# Patient Record
Sex: FEMALE | Race: WHITE | NOT HISPANIC OR LATINO | Employment: UNEMPLOYED | URBAN - NONMETROPOLITAN AREA
[De-identification: names, ages, dates, MRNs, and addresses within clinical notes are randomized per-mention and may not be internally consistent; named-entity substitution may affect disease eponyms.]

---

## 2021-01-25 LAB
BILIRUB SERPL-MCNC: NEGATIVE MG/DL
BLOOD URINE, POC: NEGATIVE
CLARITY, POC UA: CLEAR
COLOR, POC UA: NORMAL
GLUCOSE UR QL STRIP: NEGATIVE
KETONES UR QL STRIP: NORMAL
LEUKOCYTE EST, POC UA: NORMAL
NITRITE, POC UA: NEGATIVE
PH, POC UA: 6.5
POC BETA-HCG (QUAL): POSITIVE
PROTEIN, POC: NEGATIVE
SPECIFIC GRAVITY, POC UA: 1.02
UROBILINOGEN, POC UA: NORMAL

## 2021-02-23 LAB
CLARITY, POC UA: CLEAR
COLOR, POC UA: YELLOW
GLUCOSE UR QL STRIP: NEGATIVE
LEUKOCYTE EST, POC UA: NEGATIVE
NITRITE, POC UA: NEGATIVE
PROTEIN, POC: NEGATIVE

## 2021-04-20 ENCOUNTER — HISTORICAL (OUTPATIENT)
Dept: ADMINISTRATIVE | Facility: HOSPITAL | Age: 31
End: 2021-04-20

## 2021-06-17 LAB — GLUCOSE 1H P 100 G GLC PO SERPL-MCNC: 104 MG/DL (ref 70–140)

## 2021-08-03 LAB
CLARITY, POC UA: CLEAR
GLUCOSE UR QL STRIP: NEGATIVE
LEUKOCYTE EST, POC UA: NEGATIVE
NITRITE, POC UA: NEGATIVE
PROTEIN, POC: NEGATIVE

## 2021-08-05 LAB — RPR SER QL: NORMAL

## 2021-08-16 LAB
BASOPHILS # BLD AUTO: 0.05 X10(3)/MCL (ref 0.01–0.08)
BASOPHILS NFR BLD AUTO: 0.3 % (ref 0.1–1.2)
EOSINOPHIL # BLD AUTO: 0.13 X10(3)/MCL (ref 0.04–0.36)
EOSINOPHIL NFR BLD AUTO: 0.9 % (ref 0.7–7)
ERYTHROCYTE [DISTWIDTH] IN BLOOD BY AUTOMATED COUNT: 13 % (ref 11–14.5)
HCT VFR BLD AUTO: 27.1 % (ref 36–48)
HGB BLD-MCNC: 9.1 G/DL (ref 11.8–16)
IMM GRANULOCYTES # BLD AUTO: 0.08 X10E3/UL (ref 0–0.03)
IMM GRANULOCYTES NFR BLD AUTO: 0.5 % (ref 0–0.5)
LYMPHOCYTES # BLD AUTO: 2.24 X10(3)/MCL (ref 1.16–3.74)
LYMPHOCYTES NFR BLD AUTO: 15.2 % (ref 20–55)
MCH RBC QN AUTO: 29.3 PG (ref 27–34)
MCHC RBC AUTO-ENTMCNC: 33.6 G/DL (ref 31–37)
MCV RBC AUTO: 87.1 FL (ref 79–99)
MONOCYTES # BLD AUTO: 1.06 X10(3)/MCL (ref 0.24–0.36)
MONOCYTES NFR BLD AUTO: 7.2 % (ref 4.7–12.5)
NEUTROPHILS # BLD AUTO: 11.18 X10(3)/MCL (ref 1.56–6.13)
NEUTROPHILS NFR BLD AUTO: 75.9 % (ref 37–73)
PLATELET # BLD AUTO: 226 X10(3)/MCL (ref 140–371)
PMV BLD AUTO: 11.1 FL (ref 9.4–12.4)
RBC # BLD AUTO: 3.11 X10(6)/MCL (ref 4–5.1)
WBC # SPEC AUTO: 14.7 X10(3)/MCL (ref 4–11.5)

## 2022-04-10 ENCOUNTER — HISTORICAL (OUTPATIENT)
Dept: ADMINISTRATIVE | Facility: HOSPITAL | Age: 32
End: 2022-04-10

## 2022-04-29 ENCOUNTER — HISTORICAL (OUTPATIENT)
Dept: ADMINISTRATIVE | Facility: HOSPITAL | Age: 32
End: 2022-04-29

## 2022-04-29 VITALS
SYSTOLIC BLOOD PRESSURE: 112 MMHG | BODY MASS INDEX: 23.07 KG/M2 | WEIGHT: 138.44 LBS | DIASTOLIC BLOOD PRESSURE: 74 MMHG | HEIGHT: 65 IN

## 2022-05-03 NOTE — HISTORICAL [facility] CERNER
This is a historical note converted from Paul. Formatting and pictures may have been removed.  Please reference Paul for original formatting and attached multimedia. Chief Complaint  25w4d tummy check. Repeat TVUS  History of Present Illness  30yo WF  at 25w4d here for ob check  LMP/EGA/KIM  Gestational Age (EGA) and KIM?? ? * Note: EGA calculated as of 2021  ?  KIM:?2021???EGA*:?25 weeks 4 days ? ? ? ? ? ?Type:?Authoritative??????Method Date:?2020  ?  ?? ? ?Method:?Last Menstrual Period?(2020)  ?? ? ?Confirmation:?Confirmed  ?? ? ?Description:?--  ?? ? ?Comments:?--  ?? ? ?Entered by:?Jolene Bruno LPN on 2021?  ?  Other KIM Calculations for this Pregnancy:  ?? ? ?No additional KIM calculations have been recorded for this pregnancy  Gynecological History  Date of Last Pap Smear: 15  Age of Menarche: 12  STIs/STDs: No  Abnormal Pap: No  Dyspareunia: No  Postcoital Bleeding: No  Dysuria: No  Additional GYN Information: last pap 2017 wnl  Discharge OB: none reported  Sexually Active: Yes  Review of Systems  General/Constitutional:  Fever?denies?.?  Skin:  Rash?denies.  Gastrointestinal:  Abdominal pain?denies?. Constipation?denies?. Diarrhea?denies?. Heartburn?denies?. Nausea?denies?. Vomiting?denies?  Genitourinary:  Painful urination?denies.  Gynecologic:  Vaginal bleeding?denies?. Vaginal discharge?Normal. Leaking amniotic fluid?denies. ?Contractions?denies?  ?  Physical Exam  Vitals & Measurements  T:?36.2? ?C (Temporal Artery)? BP:?120/74?  HT:?160.00?cm? WT:?66.100?kg? BMI:?25.82?  Gen: NAD  Abd: Gravid, NT  Ext: No CCE  ?  TVUS: Complete previa still present.  FH: 25cm  FHT: 130  Assessment/Plan  1.?Placenta previa antepartum?O44.02  2.?25 weeks gestation of pregnancy?Z3A.25  Reviewed standard labor unit and kick count precautions.  Discussed pre-eclampsia precautions.  Discussed COVID-19 risks, social distancing, and isolation if respiratory symptoms  develop.?  Complete previa still present, pelvic rest, no intercourse  Repeat U/S on RTC  RTC 4 week   OB History  Pregnancy History???(4,0,0,4)?? ??  Pregnancy # 1  Baby 1?????????????Outcome Date:?2007????? Outcome:?Live Birth  ???Outcome or Result:?Vaginal  ???Gender:?Male????????Gest Age:?40 weeks ??????Wt:??3260 g  ???Hospital:?--????????[facility]:?--  ???Ashley Name:?--?????Babys Father:?--  ?  Pregnancy # 2  Baby 1?????????????Outcome Date:?02/15/2012????? Outcome:?Live Birth  ???Outcome or Result:?Vaginal  ???Gender:?Female????????Gest Age:?38 weeks ??????Wt:??3515 g  ???Hospital:?--????????[facility]:?--  ???Ashley Name:?--?????Babys Father:?--  ?  Pregnancy # 3  Baby 1?????????????Outcome Date:?2014????? Outcome:?Live Birth  ???Outcome or Result:?Vaginal  ???Gender:?--????????Gest Age:?40 weeks 4 days ??????Wt:?--  ???Hospital:?--????????[facility]:?--  ???Ashley Name:?--?????Babys Father:?--  ?  Pregnancy # 4  Baby 1?????????????Outcome Date:?10/25/2017????? Outcome:?Live Birth  ???Outcome or Result:?Vaginal  ???Gender:?Female????????Gest Age:?40 weeks 2 days ??????Wt:??3515 g  ???Hospital:?--????????[facility]:?--  ???Ashley Name:?--?????Babys Father:?--  Problem List/Past Medical History  Ongoing  Dizziness  Placenta previa antepartum  Pregnant  Supervision of normal pregnancy, antepartum  Historical  Pregnant  Pregnant  Pregnant  Pregnant  Medications  Prenatal 1 Plus 1 (Pt. Own), 1 tab(s), Oral, Daily  Allergies  No Known Allergies  Social History  Abuse/Neglect  No, 2021  Alcohol  Never, 2021  Employment/School  Unemployed, Highest education level: High school., 2021  Sexual  Sexually active: Yes. No, 2021  Substance Use  Never, 2021  Tobacco  Never (less than 100 in lifetime), N/A, 2021  Family History  Primary malignant neoplasm of colon: Negative: Mother, Father, Sister and Brother.  Primary malignant neoplasm of female  genital organ: Negative: Mother and Sister.  Health Maintenance  Health Maintenance  ???Pending?(in the next year)  ??? ??OverDue  ??? ? ? ?Influenza Vaccine due??10/01/20??and every 1??day(s)  ??? ? ? ?Cervical Cancer Screening due??12/05/20??and every 3??year(s)  ??? ? ? ?Alcohol Misuse Screening due??01/02/21??and every 1??year(s)  ??? ??Due?  ??? ? ? ?ADL Screening due??05/25/21??and every 1??year(s)  ??? ? ? ?Depression Screening due??05/25/21??Unknown Frequency  ??? ? ? ?Tetanus Vaccine due??05/25/21??and every 10??year(s)  ??? ??Due In Future?  ??? ? ? ?Obesity Screening not due until??01/01/22??and every 1??year(s)  ??? ? ? ?Blood Pressure Screening not due until??04/27/22??and every 1??year(s)  ???Satisfied?(in the past 1 year)  ??? ??Satisfied?  ??? ? ? ?Blood Pressure Screening on??05/25/21.??Satisfied by Mandie Suazo LPN  ??? ? ? ?Body Mass Index Check on??05/25/21.??Satisfied by Mandie Suazo LPN  ??? ? ? ?Cervical Cancer Screening on??01/25/21.??Satisfied by Trae Chandler MD  ??? ? ? ?Obesity Screening on??05/25/21.??Satisfied by Mandie Suazo LPN  ?

## 2022-09-15 ENCOUNTER — HISTORICAL (OUTPATIENT)
Dept: ADMINISTRATIVE | Facility: HOSPITAL | Age: 32
End: 2022-09-15

## 2023-11-28 ENCOUNTER — HOSPITAL ENCOUNTER (EMERGENCY)
Facility: HOSPITAL | Age: 33
Discharge: HOME | End: 2023-11-28
Attending: EMERGENCY MEDICINE
Payer: MEDICAID

## 2023-11-28 VITALS
RESPIRATION RATE: 18 BRPM | HEIGHT: 65 IN | SYSTOLIC BLOOD PRESSURE: 118 MMHG | HEART RATE: 80 BPM | DIASTOLIC BLOOD PRESSURE: 76 MMHG | BODY MASS INDEX: 23.32 KG/M2 | OXYGEN SATURATION: 98 % | TEMPERATURE: 98 F | WEIGHT: 140 LBS
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
## 2023-11-28 PROCEDURE — 25000003 PHARM REV CODE 250: Performed by: NURSE PRACTITIONER

## 2023-11-28 PROCEDURE — 90715 TDAP VACCINE 7 YRS/> IM: CPT | Performed by: NURSE PRACTITIONER

## 2023-11-28 PROCEDURE — 96374 THER/PROPH/DIAG INJ IV PUSH: CPT

## 2023-11-28 PROCEDURE — 63600175 PHARM REV CODE 636 W HCPCS: Performed by: NURSE PRACTITIONER

## 2023-11-28 PROCEDURE — 90471 IMMUNIZATION ADMIN: CPT | Performed by: NURSE PRACTITIONER

## 2023-11-28 PROCEDURE — 99284 EMERGENCY DEPT VISIT MOD MDM: CPT | Mod: 25

## 2023-11-28 RX ORDER — AMOXICILLIN AND CLAVULANATE POTASSIUM 875; 125 MG/1; MG/1
1 TABLET, FILM COATED ORAL 2 TIMES DAILY
Qty: 20 TABLET | Refills: 0 | Status: SHIPPED | OUTPATIENT
Start: 2023-11-28 | End: 2023-11-28 | Stop reason: SDUPTHER

## 2023-11-28 RX ORDER — ONDANSETRON 4 MG/1
4 TABLET, ORALLY DISINTEGRATING ORAL
Status: COMPLETED | OUTPATIENT
Start: 2023-11-28 | End: 2023-11-28

## 2023-11-28 RX ORDER — MORPHINE SULFATE 4 MG/ML
4 INJECTION, SOLUTION INTRAMUSCULAR; INTRAVENOUS
Status: COMPLETED | OUTPATIENT
Start: 2023-11-28 | End: 2023-11-28

## 2023-11-28 RX ORDER — HYDROCODONE BITARTRATE AND ACETAMINOPHEN 5; 325 MG/1; MG/1
1 TABLET ORAL EVERY 6 HOURS PRN
Qty: 12 TABLET | Refills: 0 | Status: SHIPPED | OUTPATIENT
Start: 2023-11-28

## 2023-11-28 RX ORDER — AMOXICILLIN AND CLAVULANATE POTASSIUM 875; 125 MG/1; MG/1
1 TABLET, FILM COATED ORAL 2 TIMES DAILY
Qty: 20 TABLET | Refills: 0 | Status: SHIPPED | OUTPATIENT
Start: 2023-11-28 | End: 2023-12-08

## 2023-11-28 RX ORDER — ONDANSETRON 4 MG/1
8 TABLET, ORALLY DISINTEGRATING ORAL 2 TIMES DAILY
Qty: 20 TABLET | Refills: 0 | Status: SHIPPED | OUTPATIENT
Start: 2023-11-28 | End: 2023-11-28 | Stop reason: SDUPTHER

## 2023-11-28 RX ORDER — ONDANSETRON 4 MG/1
8 TABLET, ORALLY DISINTEGRATING ORAL 2 TIMES DAILY
Qty: 20 TABLET | Refills: 0 | Status: SHIPPED | OUTPATIENT
Start: 2023-11-28 | End: 2023-12-03

## 2023-11-28 RX ORDER — MUPIROCIN 20 MG/G
OINTMENT TOPICAL 3 TIMES DAILY
Qty: 22 G | Refills: 0 | Status: SHIPPED | OUTPATIENT
Start: 2023-11-28 | End: 2023-12-03

## 2023-11-28 RX ORDER — HYDROCODONE BITARTRATE AND ACETAMINOPHEN 5; 325 MG/1; MG/1
1 TABLET ORAL EVERY 6 HOURS PRN
Qty: 12 TABLET | Refills: 0 | Status: SHIPPED | OUTPATIENT
Start: 2023-11-28 | End: 2023-11-28 | Stop reason: SDUPTHER

## 2023-11-28 RX ORDER — LIDOCAINE HYDROCHLORIDE 10 MG/ML
5 INJECTION, SOLUTION EPIDURAL; INFILTRATION; INTRACAUDAL; PERINEURAL
Status: DISCONTINUED | OUTPATIENT
Start: 2023-11-28 | End: 2023-11-28

## 2023-11-28 RX ORDER — MUPIROCIN 20 MG/G
OINTMENT TOPICAL 3 TIMES DAILY
Qty: 22 G | Refills: 0 | Status: SHIPPED | OUTPATIENT
Start: 2023-11-28 | End: 2023-11-28 | Stop reason: SDUPTHER

## 2023-11-28 RX ADMIN — MORPHINE SULFATE 4 MG: 4 INJECTION, SOLUTION INTRAMUSCULAR; INTRAVENOUS at 01:11

## 2023-11-28 RX ADMIN — ONDANSETRON 4 MG: 4 TABLET, ORALLY DISINTEGRATING ORAL at 01:11

## 2023-11-28 RX ADMIN — TETANUS TOXOID, REDUCED DIPHTHERIA TOXOID AND ACELLULAR PERTUSSIS VACCINE, ADSORBED 0.5 ML: 5; 2.5; 8; 8; 2.5 SUSPENSION INTRAMUSCULAR at 01:11

## 2023-11-28 NOTE — ED PROVIDER NOTES
Encounter Date: 11/28/2023       History     Chief Complaint   Patient presents with    Animal Bite     Brought per [facility] for dog bite to both arms and lower back. EMS reports animal control on scene     Patient is a 33-year-old female presents emerged department via [facility] for dog bite wounds to bilateral forearm.  The patient states she was trying to associate this dog with an underground electric fence she had a shot, dog was trying to take him to the edges of the boundary of the fence and when she did and shock the dog he turned around her and attacked her biting both of her forearms.  She has approximately 12 superficial lacerations to the right forearm as well as around 6-7 lacerations to the left forearm.  There is no active bleeding.  There is no major deformity to the forearm except for the obvious puncture wounds.  She does present with sterile dressing from [facility] an ambulance.  She denies any other complaints associated symptoms at this time.      Review of patient's allergies indicates:  No Known Allergies  No past medical history on file.  No past surgical history on file.  No family history on file.  Social History     Tobacco Use    Smoking status: Never    Smokeless tobacco: Never     Review of Systems   Constitutional:  Negative for activity change, appetite change and fever.   HENT:  Negative for congestion, dental problem and sore throat.    Eyes:  Negative for discharge and itching.   Respiratory:  Negative for apnea, chest tightness and shortness of breath.    Cardiovascular:  Negative for chest pain.   Gastrointestinal:  Negative for abdominal distention, abdominal pain and nausea.   Endocrine: Negative for cold intolerance and heat intolerance.   Genitourinary:  Negative for dysuria, vaginal bleeding, vaginal discharge and vaginal pain.   Musculoskeletal:  Negative for back pain.   Skin:  Negative for rash.   Neurological:  Negative for dizziness, facial asymmetry and weakness.   Hematological:   Does not bruise/bleed easily.   Psychiatric/Behavioral:  Negative for agitation and behavioral problems.    All other systems reviewed and are negative.      Physical Exam     Initial Vitals [11/28/23 1256]   BP Pulse Resp Temp SpO2   126/69 74 16 97.6 °F (36.4 °C) 100 %      MAP       --         Physical Exam    Nursing note and vitals reviewed.  Constitutional: Vital signs are normal. She appears well-developed and well-nourished.  Non-toxic appearance. She does not have a sickly appearance.   HENT:   Head: Normocephalic and atraumatic.   Right Ear: External ear normal.   Left Ear: External ear normal.   Eyes: Conjunctivae, EOM and lids are normal. Lids are everted and swept, no foreign bodies found.   Neck: Trachea normal and phonation normal. Neck supple. No thyroid mass and no thyromegaly present.   Normal range of motion.   Full passive range of motion without pain.     Cardiovascular:  Normal rate, regular rhythm, S1 normal, S2 normal, normal heart sounds, intact distal pulses and normal pulses.           Pulmonary/Chest: Breath sounds normal. No respiratory distress.   Musculoskeletal:      Cervical back: Full passive range of motion without pain, normal range of motion and neck supple.     Lymphadenopathy:     She has no cervical adenopathy.   Neurological: She is alert and oriented to person, place, and time. She has normal strength.   Skin: Skin is warm, dry and intact. Capillary refill takes less than 2 seconds.   Moderate amount of edema to bilateral lower extremities.  Multiple dog bite wounds to right and left forearm.  There is approximately 12 to the right with 2 that are gaping as well as 6 laceration to the left forearm that are also mildly gaping.  The largest of these measures only around 1 cm in his approximately 3 mm wide and around 5 mm deep.   Psychiatric: She has a normal mood and affect. Her speech is normal and behavior is normal. Judgment normal. Cognition and memory are normal.          ED Course   Procedures  Labs Reviewed - No data to display       Imaging Results              X-Ray Forearm Left (Final result)  Result time 11/28/23 15:23:12      Final result by Real Lr MD (11/28/23 15:23:12)                   Impression:      1. No acute osseous defect identified  2. Suspect soft tissue injury and soft tissue emphysema at the forearm.  Clinical correlation is indicated      Electronically signed by: Real Lr  Date:    11/28/2023  Time:    15:23               Narrative:    EXAMINATION:  XR FOREARM LEFT    CLINICAL HISTORY:  Bitten by dog, initial encounter; .    COMPARISON:  None available.    FINDINGS:  AP and lateral views reveal no definite fracture or dislocation.  Joint spaces appear grossly intact.  No aggressive osteolytic or osteoblastic lesion is seen.  There is disruption of soft tissues and scattered suspect the soft tissue emphysema at the forearm.  No obvious radiopaque foreign body is seen.                                       X-Ray Forearm Right (Final result)  Result time 11/28/23 15:22:26      Final result by Real Lr MD (11/28/23 15:22:26)                   Impression:      1. No acute osseous defect identified  2. Suspect soft tissue injury at the mid forearm.  Clinical correlation is indicated      Electronically signed by: Real Lr  Date:    11/28/2023  Time:    15:22               Narrative:    EXAMINATION:  XR FOREARM RIGHT    CLINICAL HISTORY:  Bitten by dog, initial encounter; .    COMPARISON:  None available.    FINDINGS:  AP and lateral views reveal no definite fracture dislocation.  Joint spaces appear grossly intact.  No aggressive osteolytic or osteoblastic lesion is seen.There is disruption of soft tissues at the mid forearm suggesting soft tissue injury.                                       Medications   Tdap (BOOSTRIX) vaccine injection 0.5 mL (0.5 mLs Intramuscular Given 11/28/23 1825)   morphine injection 4 mg (4 mg  Intravenous Given 11/28/23 1333)   ondansetron disintegrating tablet 4 mg (4 mg Oral Given 11/28/23 1333)     Medical Decision Making  Patient is a 33-year-old female presents emerged department via [facility] for dog bite wounds to bilateral forearm.  The patient states she was trying to associate this dog with an underground electric fence she had a shot, dog was trying to take him to the edges of the boundary of the fence and when she did and shock the dog he turned around her and attacked her biting both of her forearms.  She has approximately 12 superficial lacerations to the right forearm as well as around 6-7 lacerations to the left forearm.  There is no active bleeding.  There is no major deformity to the forearm except for the obvious puncture wounds.  She does present with sterile dressing from [facility] an ambulance.  She denies any other complaints associated symptoms at this time.        Problems Addressed:  Dog bite, initial encounter: acute illness or injury     Details: Wounds were cleaned thoroughly with chlorhexidine saline.  Loose approximation of wounds with sutures was going to be performed but patient refused even after moderate amount of education about risks benefits of this.  Antibiotics topical and oral sent abuse on outpatient basis.  Also pain medicine nausea medicine sent.  Strict ED return precautions discussed [place] change or worsening symptoms.    Amount and/or Complexity of Data Reviewed  Radiology: ordered.    Risk  Prescription drug management.               ED Course as of 11/28/23 1618 Tue Nov 28, 2023   1400 Wounds were cleaned thoroughly with chlorhexidine and saline and wrapped sterile dressing. [SL]   1606 Went in the room to loosely approximate some of the dog bite wounds that were more gaping but the patient is refusing at this time.  She states the reasons because she is scared of nasal.  She further states she is not worried about scarring if this is why I was doing the  process.  I did explain to her that it is mainly for scarring but to loosely approximate these wounds would help the wound heal better especially since some of these are more gaping.  She again is refusing these wounds I took significant amount of time to give her the risks and benefits that she has the ability this time to make clear decisions she does have family at the bedside that witnessed this conversation.  Will send home with topical and oral antibiotics.  Will give medication for the pain at home.  Strict ED return precautions discussed for any change or worsening symptoms and she was aware of the plan of care and had no other questions or concerns prior to discharge. [SL]      ED Course User Index  [SL] Ricardo Alcocer FNP                           Clinical Impression:  Final diagnoses:  [W54.0XXA] Dog bite  [W54.0XXA] Dog bite, initial encounter (Primary)          ED Disposition Condition    Discharge Stable          ED Prescriptions       Medication Sig Dispense Start Date End Date Auth. Provider    amoxicillin-clavulanate 875-125mg (AUGMENTIN) 875-125 mg per tablet Take 1 tablet by mouth 2 (two) times daily. for 10 days 20 tablet 11/28/2023 12/8/2023 Ricardo Alcocer FNP    HYDROcodone-acetaminophen (NORCO) 5-325 mg per tablet Take 1 tablet by mouth every 6 (six) hours as needed for Pain. 12 tablet 11/28/2023 -- Ricardo Alcocer FNP    ondansetron (ZOFRAN-ODT) 4 MG TbDL Take 2 tablets (8 mg total) by mouth 2 (two) times daily. for 5 days 20 tablet 11/28/2023 12/3/2023 Ricardo Alcocer FNP    mupirocin (BACTROBAN) 2 % ointment Apply topically 3 (three) times daily. for 5 days 22 g 11/28/2023 12/3/2023 Ricardo Alcocer FNP          Follow-up Information       Follow up With Specialties Details Why Contact Info    Sara Menjivar FNP-C Family Medicine Schedule an appointment as soon as possible for a visit in 2 days As needed, For wound re-check [address]  [facility] [address] [phone]                Ricardo Alcocer, [facility]  11/28/23 1611

## 2023-11-28 NOTE — ED NOTES
Pt arrived via EMS with multiple dog bites to Lucian. FA & Rt flank area. Dressings intact per ems with moderate sanguineous drainage noted. Was bitten by family dog that they recently adopted from another family member. Animal control noted getting report from pt. Pt in stable condition. CORINA.